# Patient Record
Sex: FEMALE | Race: WHITE | NOT HISPANIC OR LATINO | URBAN - METROPOLITAN AREA
[De-identification: names, ages, dates, MRNs, and addresses within clinical notes are randomized per-mention and may not be internally consistent; named-entity substitution may affect disease eponyms.]

---

## 2017-11-08 ENCOUNTER — ON CAMPUS - OUTPATIENT (OUTPATIENT)
Dept: URBAN - METROPOLITAN AREA HOSPITAL 35 | Facility: HOSPITAL | Age: 72
End: 2017-11-08
Payer: MEDICARE

## 2017-11-08 DIAGNOSIS — Z12.11 ENCOUNTER FOR SCREENING FOR MALIGNANT NEOPLASM OF COLON: ICD-10-CM

## 2017-11-08 PROCEDURE — G0121 COLON CA SCRN NOT HI RSK IND: HCPCS

## 2021-06-28 ENCOUNTER — OFFICE (OUTPATIENT)
Dept: URBAN - METROPOLITAN AREA CLINIC 102 | Facility: CLINIC | Age: 76
End: 2021-06-28
Payer: COMMERCIAL

## 2021-06-28 VITALS
TEMPERATURE: 97.3 F | DIASTOLIC BLOOD PRESSURE: 56 MMHG | HEART RATE: 85 BPM | WEIGHT: 265 LBS | HEIGHT: 59 IN | SYSTOLIC BLOOD PRESSURE: 118 MMHG

## 2021-06-28 DIAGNOSIS — K62.3 RECTAL PROLAPSE: ICD-10-CM

## 2021-06-28 DIAGNOSIS — K62.5 HEMORRHAGE OF ANUS AND RECTUM: ICD-10-CM

## 2021-06-28 PROCEDURE — 99204 OFFICE O/P NEW MOD 45 MIN: CPT | Performed by: PHYSICIAN ASSISTANT

## 2021-06-28 NOTE — SERVICEHPINOTES
Ms. Velasquez is here to discuss "part of her rectum coming out". No rectal pain. She notes that "part of her rectum comes out during defecation and then it slides right back in". She will see a little bit of BRBPR after BMs when wiping. No constipation or diarrhea. Has seen pellet-like stools which went back to "regular" when she started eating more fiber. Has regular, easily to pass BM.   Colonoscopy 11/2017 showed moderate sized internal hemorrhoids and left sided diverticulosis. No known cardiac issues.

## 2021-08-17 ENCOUNTER — ON CAMPUS - OUTPATIENT (OUTPATIENT)
Dept: URBAN - METROPOLITAN AREA HOSPITAL 37 | Facility: HOSPITAL | Age: 76
End: 2021-08-17
Payer: COMMERCIAL

## 2021-08-17 DIAGNOSIS — R19.4 CHANGE IN BOWEL HABIT: ICD-10-CM

## 2021-08-17 PROCEDURE — 45378 DIAGNOSTIC COLONOSCOPY: CPT | Performed by: INTERNAL MEDICINE

## 2021-10-11 ENCOUNTER — OFFICE (OUTPATIENT)
Dept: URBAN - METROPOLITAN AREA TELEHEALTH 12 | Facility: TELEHEALTH | Age: 76
End: 2021-10-11
Payer: COMMERCIAL

## 2021-10-11 VITALS — HEIGHT: 59 IN | WEIGHT: 265 LBS

## 2021-10-11 DIAGNOSIS — K64.8 OTHER HEMORRHOIDS: ICD-10-CM

## 2021-10-11 DIAGNOSIS — K62.5 HEMORRHAGE OF ANUS AND RECTUM: ICD-10-CM

## 2021-10-11 PROCEDURE — 99213 OFFICE O/P EST LOW 20 MIN: CPT | Performed by: PHYSICIAN ASSISTANT

## 2021-10-11 NOTE — SERVICEHPINOTES
PATIENT VERIFIED BY DATE OF BIRTH AND NAME. Patient has been consented for this telecommunication visit.   Ms. Velasquez is here for f/u. She thought that she may have rectal prolapse. A colonoscopy was obtained which showed no rectal prolapse when the patient strained. She did have large internal hemorrhoids grade 1. She briefly spoke to Dr. Swanson about banding but wants to try medication first as it is difficult for her to make it into the office. She notes that the hemorrhoid prolapses out and will "slide back in after BMs" after she moves around. It can bleed at times too. It causes her no discomfort. She discussed repeating the colonoscopy with Dr. Swanson with a more extensive prep but she has decided against it d/t her age. ROS as per HPI and otherwise is unremarkable.

## 2021-10-13 ENCOUNTER — PREPPED CHART (OUTPATIENT)
Dept: URBAN - METROPOLITAN AREA CLINIC 98 | Facility: CLINIC | Age: 76
End: 2021-10-13

## 2021-10-13 PROBLEM — H33.011 RETINAL DETACHMENT WITH SINGLE BREAK: Noted: 2021-10-13

## 2021-10-13 PROBLEM — H35.371 EPIRETINAL MEMBRANE: Noted: 2021-10-13

## 2021-10-13 PROBLEM — H43.812 POSTERIOR VITREOUS DETACHMENT: Noted: 2021-10-13

## 2021-10-13 PROBLEM — E11.3393 MODERATE NONPROLIFERATIVE DIABETIC RETINOPATHY: Noted: 2021-10-13

## 2022-04-14 ASSESSMENT — TONOMETRY
OD_IOP_MMHG: 12
OS_IOP_MMHG: 12

## 2022-04-16 ASSESSMENT — VISUAL ACUITY
OD_PH: 20/20-2
OS_SC: 20/25
OS_PH: 20/20-2
OD_SC: 20/25-3